# Patient Record
Sex: FEMALE | Race: ASIAN | NOT HISPANIC OR LATINO | Employment: FULL TIME | URBAN - METROPOLITAN AREA
[De-identification: names, ages, dates, MRNs, and addresses within clinical notes are randomized per-mention and may not be internally consistent; named-entity substitution may affect disease eponyms.]

---

## 2020-02-05 ENCOUNTER — OFFICE VISIT (OUTPATIENT)
Dept: FAMILY MEDICINE | Facility: CLINIC | Age: 45
End: 2020-02-05
Payer: COMMERCIAL

## 2020-02-05 VITALS
SYSTOLIC BLOOD PRESSURE: 139 MMHG | WEIGHT: 146 LBS | TEMPERATURE: 99 F | HEART RATE: 80 BPM | HEIGHT: 62 IN | DIASTOLIC BLOOD PRESSURE: 86 MMHG | BODY MASS INDEX: 26.87 KG/M2

## 2020-02-05 DIAGNOSIS — R73.03 PRE-DIABETES: ICD-10-CM

## 2020-02-05 DIAGNOSIS — E06.9 THYROIDITIS: ICD-10-CM

## 2020-02-05 DIAGNOSIS — I10 BENIGN ESSENTIAL HTN: Primary | ICD-10-CM

## 2020-02-05 DIAGNOSIS — M25.512 ARTHRALGIA OF LEFT SHOULDER REGION: ICD-10-CM

## 2020-02-05 PROCEDURE — 3079F PR MOST RECENT DIASTOLIC BLOOD PRESSURE 80-89 MM HG: ICD-10-PCS | Mod: CPTII,S$GLB,, | Performed by: FAMILY MEDICINE

## 2020-02-05 PROCEDURE — 99205 OFFICE O/P NEW HI 60 MIN: CPT | Mod: S$GLB,,, | Performed by: FAMILY MEDICINE

## 2020-02-05 PROCEDURE — 3075F SYST BP GE 130 - 139MM HG: CPT | Mod: CPTII,S$GLB,, | Performed by: FAMILY MEDICINE

## 2020-02-05 PROCEDURE — 3075F PR MOST RECENT SYSTOLIC BLOOD PRESS GE 130-139MM HG: ICD-10-PCS | Mod: CPTII,S$GLB,, | Performed by: FAMILY MEDICINE

## 2020-02-05 PROCEDURE — 99999 PR PBB SHADOW E&M-NEW PATIENT-LVL III: ICD-10-PCS | Mod: PBBFAC,,, | Performed by: FAMILY MEDICINE

## 2020-02-05 PROCEDURE — 3008F BODY MASS INDEX DOCD: CPT | Mod: CPTII,S$GLB,, | Performed by: FAMILY MEDICINE

## 2020-02-05 PROCEDURE — 99999 PR PBB SHADOW E&M-NEW PATIENT-LVL III: CPT | Mod: PBBFAC,,, | Performed by: FAMILY MEDICINE

## 2020-02-05 PROCEDURE — 3079F DIAST BP 80-89 MM HG: CPT | Mod: CPTII,S$GLB,, | Performed by: FAMILY MEDICINE

## 2020-02-05 PROCEDURE — 3008F PR BODY MASS INDEX (BMI) DOCUMENTED: ICD-10-PCS | Mod: CPTII,S$GLB,, | Performed by: FAMILY MEDICINE

## 2020-02-05 PROCEDURE — 99205 PR OFFICE/OUTPT VISIT, NEW, LEVL V, 60-74 MIN: ICD-10-PCS | Mod: S$GLB,,, | Performed by: FAMILY MEDICINE

## 2020-02-05 RX ORDER — PREDNISONE 10 MG/1
TABLET ORAL
COMMUNITY
Start: 2020-01-23 | End: 2020-03-02 | Stop reason: ALTCHOICE

## 2020-02-05 RX ORDER — LISINOPRIL 10 MG/1
10 TABLET ORAL DAILY
COMMUNITY
Start: 2020-01-18 | End: 2020-03-03 | Stop reason: SDUPTHER

## 2020-02-05 RX ORDER — LEVOTHYROXINE SODIUM 50 UG/1
50 TABLET ORAL
Qty: 30 TABLET | Refills: 11 | Status: SHIPPED | OUTPATIENT
Start: 2020-02-05 | End: 2020-03-08 | Stop reason: SDUPTHER

## 2020-02-05 RX ORDER — ASPIRIN 81 MG/1
81 TABLET ORAL DAILY
COMMUNITY
Start: 2020-01-10

## 2020-02-05 NOTE — PROGRESS NOTES
The patient presents today to fu recent fatigue lt shoulder pain. BP was el and was started on lisinopril. Lab showed el TSH and TPO T4 borderline low. Also a1c cw pre diabetes     Past Medical History:  Past Medical History:   Diagnosis Date    Asthma     Hypertension      Past Surgical History:   Procedure Laterality Date     SECTION       Review of patient's allergies indicates:  No Known Allergies  Current Outpatient Medications on File Prior to Visit   Medication Sig Dispense Refill    aspirin (ECOTRIN) 81 MG EC tablet Take 81 mg by mouth.      lisinopril 10 MG tablet Take 10 mg by mouth once daily.      predniSONE (DELTASONE) 10 MG tablet TAKE 1 TABLET BY MOUTH IN THE MORNING WITH FOOD       No current facility-administered medications on file prior to visit.      Social History     Socioeconomic History    Marital status:      Spouse name: Not on file    Number of children: Not on file    Years of education: Not on file    Highest education level: Not on file   Occupational History    Not on file   Social Needs    Financial resource strain: Not on file    Food insecurity:     Worry: Not on file     Inability: Not on file    Transportation needs:     Medical: Not on file     Non-medical: Not on file   Tobacco Use    Smoking status: Never Smoker   Substance and Sexual Activity    Alcohol use: Not on file    Drug use: Not on file    Sexual activity: Not on file   Lifestyle    Physical activity:     Days per week: Not on file     Minutes per session: Not on file    Stress: Not on file   Relationships    Social connections:     Talks on phone: Not on file     Gets together: Not on file     Attends [employer] service: Not on file     Active member of club or organization: Not on file     Attends meetings of clubs or organizations: Not on file     Relationship status: Not on file   Other Topics Concern    Not on file   Social History Narrative    Not on file     History reviewed.  No pertinent family history.      ROS:GENERAL: No fever, chills, fatigability or weight loss.  SKIN: No rashes, itching or changes in color or texture of skin.  HEAD: No headaches or recent head trauma.EYES: Visual acuity fine. No photophobia, ocular pain or diplopia.EARS: Denies ear pain, discharge or vertigo.NOSE: No loss of smell, no epistaxis or postnasal drip.MOUTH & THROAT: No hoarseness or change in voice. No excessive gum bleeding.NODES: Denies swollen glands.  CHEST: Denies MORENO, cyanosis, wheezing, cough and sputum production.  CARDIOVASCULAR: Denies chest pain, PND, orthopnea or reduced exercise tolerance.  ABDOMEN: Appetite fine. No weight loss. Denies diarrhea, abdominal pain, hematemesis or blood in stool.  URINARY: No flank pain, dysuria or hematuria.  PERIPHERAL VASCULAR: No claudication or cyanosis.  MUSCULOSKELETAL: See above.  NEUROLOGIC: No history of seizures, paralysis, alteration of gait or coordination.  PE:    HEAD: Normocephalic, atraumatic.EYES: PERRL. EOMI.   EARS: TM's intact. Light reflex normal. No retraction or perforation.   NOSE: Mucosa pink. Airway clear.MOUTH & THROAT: No tonsillar enlargement. No pharyngeal erythema or exudate. No stridor.  NODES: No cervical, axillary or inguinal lymph node enlargement.  CHEST: Lungs clear to auscultation.  CARDIOVASCULAR: Normal S1, S2. No rubs, murmurs or gallops.  ABDOMEN: Bowel sounds normal. Not distended. Soft. No tenderness or masses.  MUSCULOSKELETAL: Tender anterior lt shoulder rom full but exac pain   NEUROLOGIC: Cranial Nerves: II-XII grossly intact.  Motor: 5/5 strength major flexors/extensors.  DTR's: Knees, Ankles 2+ and equal bilaterally; downgoing toes.  Sensory: Intact to light touch distally.  Gait & Posture: Normal gait and fine motion. No cerebellar signs.     Impression:Thyroiditis  Pre DM   HBP  Shoulder inflammation  Plan:Lab eval reviewed TSH 21 , TPO elevated, a1c 5.7   Rec diet and ex recs  Start thyroid replacement 50  mcg   Rec acetaminophen or advil for shoulder arthralgia   FU w PCP next week

## 2020-03-02 ENCOUNTER — OFFICE VISIT (OUTPATIENT)
Dept: FAMILY MEDICINE | Facility: CLINIC | Age: 45
End: 2020-03-02
Payer: COMMERCIAL

## 2020-03-02 ENCOUNTER — LAB VISIT (OUTPATIENT)
Dept: LAB | Facility: HOSPITAL | Age: 45
End: 2020-03-02
Attending: INTERNAL MEDICINE
Payer: COMMERCIAL

## 2020-03-02 VITALS
BODY MASS INDEX: 27.23 KG/M2 | DIASTOLIC BLOOD PRESSURE: 82 MMHG | TEMPERATURE: 99 F | SYSTOLIC BLOOD PRESSURE: 116 MMHG | HEIGHT: 62 IN | HEART RATE: 76 BPM | WEIGHT: 148 LBS

## 2020-03-02 DIAGNOSIS — R73.03 PRE-DIABETES: ICD-10-CM

## 2020-03-02 DIAGNOSIS — E06.9 THYROIDITIS: ICD-10-CM

## 2020-03-02 DIAGNOSIS — E03.9 HYPOTHYROIDISM, UNSPECIFIED TYPE: ICD-10-CM

## 2020-03-02 DIAGNOSIS — I10 BENIGN ESSENTIAL HTN: Primary | ICD-10-CM

## 2020-03-02 PROCEDURE — 99214 OFFICE O/P EST MOD 30 MIN: CPT | Mod: S$GLB,,, | Performed by: INTERNAL MEDICINE

## 2020-03-02 PROCEDURE — 3074F SYST BP LT 130 MM HG: CPT | Mod: CPTII,S$GLB,, | Performed by: INTERNAL MEDICINE

## 2020-03-02 PROCEDURE — 36415 COLL VENOUS BLD VENIPUNCTURE: CPT | Mod: PO

## 2020-03-02 PROCEDURE — 99999 PR PBB SHADOW E&M-EST. PATIENT-LVL III: CPT | Mod: PBBFAC,,, | Performed by: INTERNAL MEDICINE

## 2020-03-02 PROCEDURE — 3074F PR MOST RECENT SYSTOLIC BLOOD PRESSURE < 130 MM HG: ICD-10-PCS | Mod: CPTII,S$GLB,, | Performed by: INTERNAL MEDICINE

## 2020-03-02 PROCEDURE — 3079F DIAST BP 80-89 MM HG: CPT | Mod: CPTII,S$GLB,, | Performed by: INTERNAL MEDICINE

## 2020-03-02 PROCEDURE — 83036 HEMOGLOBIN GLYCOSYLATED A1C: CPT

## 2020-03-02 PROCEDURE — 99214 PR OFFICE/OUTPT VISIT, EST, LEVL IV, 30-39 MIN: ICD-10-PCS | Mod: S$GLB,,, | Performed by: INTERNAL MEDICINE

## 2020-03-02 PROCEDURE — 84443 ASSAY THYROID STIM HORMONE: CPT

## 2020-03-02 PROCEDURE — 99999 PR PBB SHADOW E&M-EST. PATIENT-LVL III: ICD-10-PCS | Mod: PBBFAC,,, | Performed by: INTERNAL MEDICINE

## 2020-03-02 PROCEDURE — 3079F PR MOST RECENT DIASTOLIC BLOOD PRESSURE 80-89 MM HG: ICD-10-PCS | Mod: CPTII,S$GLB,, | Performed by: INTERNAL MEDICINE

## 2020-03-02 PROCEDURE — 84439 ASSAY OF FREE THYROXINE: CPT

## 2020-03-02 PROCEDURE — 3008F BODY MASS INDEX DOCD: CPT | Mod: CPTII,S$GLB,, | Performed by: INTERNAL MEDICINE

## 2020-03-02 PROCEDURE — 3008F PR BODY MASS INDEX (BMI) DOCUMENTED: ICD-10-PCS | Mod: CPTII,S$GLB,, | Performed by: INTERNAL MEDICINE

## 2020-03-02 RX ORDER — CLOBETASOL PROPIONATE 0.5 MG/G
CREAM TOPICAL
COMMUNITY
Start: 2020-02-19

## 2020-03-02 NOTE — Clinical Note
Since blood pressure was still elevated on repeat. I recommend increasing the dose of her Lisinopril to 20mg once daily. I have sent in a new prescription to her pharmacy. Please schedule a blood pressure check with ancillary in 2 weeks

## 2020-03-03 LAB
ESTIMATED AVG GLUCOSE: 114 MG/DL (ref 68–131)
HBA1C MFR BLD HPLC: 5.6 % (ref 4–5.6)
T4 FREE SERPL-MCNC: 0.75 NG/DL (ref 0.71–1.51)
TSH SERPL DL<=0.005 MIU/L-ACNC: 8.01 UIU/ML (ref 0.4–4)

## 2020-03-03 RX ORDER — LISINOPRIL 20 MG/1
20 TABLET ORAL DAILY
Qty: 30 TABLET | Refills: 11 | Status: SHIPPED | OUTPATIENT
Start: 2020-03-03

## 2020-03-03 NOTE — PROGRESS NOTES
Assessment/Plan:    Benign essential HTN  -Above goal today  -Currently on lisinopril 10 mg. Plan to increase to 20 mg daily  -BP check in 2 weeks  -Denies adverse effects of medications  -Continue lifestyle modification with low sodium diet and exercise     Pre-diabetes  -Elevated A1C in past of 5.7  -Plan to repeat  -Continue working on lifestyle modification with diet and exercise    Thyroiditis  -Recent elevated TSH and TPO antibodies several months ago  -Denies symptoms of painful thyroid at any time  -Plan to start on Synthroid but patient still has not started  -Agreed to repeat thyroid studies but if TSH still elevated, we will start her on therapy  -She does reports symptoms of fatigue, otherwise denies any other symptoms      _____________________________________________________________________________________________________________________________________________________    Orders this visit:    Benign essential HTN  -     lisinopril (PRINIVIL,ZESTRIL) 20 MG tablet; Take 1 tablet (20 mg total) by mouth once daily.  Dispense: 30 tablet; Refill: 11    Pre-diabetes  -     Hemoglobin A1c; Standing    Hypothyroidism, unspecified type  -     TSH; Future; Expected date: 03/02/2020  -     T4, free; Future; Expected date: 03/02/2020    Thyroiditis      Follow up in about 6 months (around 9/2/2020).    Joseline To MD  _____________________________________________________________________________________________________________________________________________________    HPI:    Patient is in clinic today as an established patient, new to me, here to establish care. Patient is a 43 yo Chinese F with a PMH of hypertension, prediabetes and recently diagnosed hypothyroidism.  Patient was recently evaluated by 1 of my colleagues and was started on Synthroid after noted to have an elevated TSH and elevated TPO antibodies.  However, she has not started this medication.  Patient was unsure exactly with medication was for  and with the testing indicated.  We had a discussion about hypothyroidism and testing results.  Patient does report some fatigue, otherwise denies any other symptoms related to hypothyroidism.  Patient would like to read check her labs but if still elevated, she agrees to start medications.    Patient recently diagnosed with hypertension and prediabetes.  She has been started on lisinopril since that time with some improvement of blood pressure, however still remains slightly elevated.  Remains asymptomatic.  As for her prediabetes, A1c was only mildly elevated.  We discussed diet and exercise.  No need for treatment at this time.    Patient reports a history of arthralgias of her left shoulder but seems to be improving.  This was addressed by her previous PCP.    No other complaints today.  Health maintenance reviewed.  Annual labs up-to-date.  Mammogram and [facility] smear in [facility].  Both normal.    Past Medical History:  Past Medical History:   Diagnosis Date    Asthma     Hypertension     Hypothyroid     Prediabetes      Past Surgical History:   Procedure Laterality Date     SECTION       Review of patient's allergies indicates:  No Known Allergies  Social History     Tobacco Use    Smoking status: Never Smoker    Smokeless tobacco: Never Used   Substance Use Topics    Alcohol use: Not Currently    Drug use: Not Currently     History reviewed. No pertinent family history.  Current Outpatient Medications on File Prior to Visit   Medication Sig Dispense Refill    aspirin (ECOTRIN) 81 MG EC tablet Take 81 mg by mouth once daily.       clobetasoL (TEMOVATE) 0.05 % cream Apply topically as needed.      [DISCONTINUED] lisinopril 10 MG tablet Take 10 mg by mouth once daily.      levothyroxine (SYNTHROID) 50 MCG tablet Take 1 tablet (50 mcg total) by mouth before breakfast. (Patient not taking: Reported on 3/2/2020) 30 tablet 11     No current facility-administered medications on file prior to  "visit.        Review of Systems   Constitutional: Negative for chills, diaphoresis, fatigue and fever.   HENT: Negative for congestion, ear pain, postnasal drip, sinus pain and sore throat.    Eyes: Negative for pain and redness.   Respiratory: Negative for cough, chest tightness and shortness of breath.    Cardiovascular: Negative for chest pain and leg swelling.   Gastrointestinal: Negative for abdominal pain, constipation, diarrhea, nausea and vomiting.   Genitourinary: Negative for dysuria and hematuria.   Musculoskeletal: Negative for joint swelling.   Skin: Negative for rash.   Neurological: Negative for dizziness, syncope and headaches.       Vitals:    03/02/20 1606 03/02/20 1647   BP: (!) 132/91 116/82   Pulse: 76    Temp: 98.7 °F (37.1 °C)    TempSrc: Oral    Weight: 67.1 kg (148 lb)    Height: 5' 2" (1.575 m)        Wt Readings from Last 3 Encounters:   03/02/20 67.1 kg (148 lb)   02/05/20 66.2 kg (146 lb)       Physical Exam   Constitutional: She is oriented to person, place, and time. She appears well-developed and well-nourished. No distress.   HENT:   Head: Normocephalic and atraumatic.   Eyes: Conjunctivae and EOM are normal.   Neck: Normal range of motion. Neck supple. No tracheal deviation present. No thyromegaly present.   Cardiovascular: Normal rate, regular rhythm, normal heart sounds and intact distal pulses.   No murmur heard.  Pulmonary/Chest: Effort normal and breath sounds normal. No respiratory distress.   Abdominal: She exhibits no distension.   Musculoskeletal: Normal range of motion.   Neurological: She is alert and oriented to person, place, and time.   Skin: Skin is warm and dry.   Psychiatric: She has a normal mood and affect.       "

## 2020-03-03 NOTE — ASSESSMENT & PLAN NOTE
-Elevated A1C in past of 5.7  -Plan to repeat  -Continue working on lifestyle modification with diet and exercise

## 2020-03-03 NOTE — ASSESSMENT & PLAN NOTE
-Above goal today  -Currently on lisinopril 10 mg. Plan to increase to 20 mg daily  -BP check in 2 weeks  -Denies adverse effects of medications  -Continue lifestyle modification with low sodium diet and exercise

## 2020-03-03 NOTE — ASSESSMENT & PLAN NOTE
-Recent elevated TSH and TPO antibodies several months ago  -Denies symptoms of painful thyroid at any time  -Plan to start on Synthroid but patient still has not started  -Agreed to repeat thyroid studies but if TSH still elevated, we will start her on therapy  -She does reports symptoms of fatigue, otherwise denies any other symptoms

## 2020-03-04 ENCOUNTER — PATIENT OUTREACH (OUTPATIENT)
Dept: ADMINISTRATIVE | Facility: HOSPITAL | Age: 45
End: 2020-03-04

## 2020-03-04 NOTE — PROGRESS NOTES
Patient notified of lisinopril dosage change and medication sent to pharmacy. Nurse visit scheduled for BP check on 03/19/2020 @ 4:15. Patient aware of appointment date and time.

## 2020-03-04 NOTE — PROGRESS NOTES
Received most recent mammogram and pap smear with hpv from [facility] scanned into chart today.

## 2020-03-08 DIAGNOSIS — E03.9 HYPOTHYROIDISM, UNSPECIFIED TYPE: Primary | ICD-10-CM

## 2020-03-08 RX ORDER — LEVOTHYROXINE SODIUM 50 UG/1
50 TABLET ORAL
Qty: 30 TABLET | Refills: 11 | Status: SHIPPED | OUTPATIENT
Start: 2020-03-08 | End: 2021-03-08

## 2020-03-09 NOTE — PROGRESS NOTES
Please CALL patient with below results.    Please schedule the following orders:    TSH & Free T4 in 8 weeks.    I have reviewed the following labs results:    Thyroid studies continue to be elevated. I recommend starting the thyroid medication we discussed in clinic, Synthroid 50 mcg. We will recheck your labs in 8 weeks to make sure the levels have normalized.    Hemoglobin A1c is normal at 5.6. Prediabetes is considered to be an A1C 5.7 and greater. Continue to work on diet and exercise to prevent progression to prediabetes.      Please let me know if patient has any additional questions or concerns about above.

## 2020-03-12 ENCOUNTER — TELEPHONE (OUTPATIENT)
Dept: FAMILY MEDICINE | Facility: CLINIC | Age: 45
End: 2020-03-12

## 2020-03-12 NOTE — TELEPHONE ENCOUNTER
----- Message from Giselle Soto sent at 3/12/2020 12:29 PM CDT -----  Contact: pt  Pt called to verify which medication to  at pharmacy (spouse currently at pharmacy) ... [phone] (home)

## 2020-03-12 NOTE — TELEPHONE ENCOUNTER
"Attempted to contact patient, no answer, "call could not be completed at this time", no voicemail set up.  "

## 2021-01-13 DIAGNOSIS — Z12.31 OTHER SCREENING MAMMOGRAM: ICD-10-CM

## 2021-04-09 ENCOUNTER — PATIENT OUTREACH (OUTPATIENT)
Dept: ADMINISTRATIVE | Facility: HOSPITAL | Age: 46
End: 2021-04-09

## 2021-09-17 ENCOUNTER — PATIENT OUTREACH (OUTPATIENT)
Dept: ADMINISTRATIVE | Facility: HOSPITAL | Age: 46
End: 2021-09-17

## 2021-10-22 ENCOUNTER — PATIENT OUTREACH (OUTPATIENT)
Dept: ADMINISTRATIVE | Facility: HOSPITAL | Age: 46
End: 2021-10-22

## 2021-11-19 ENCOUNTER — PATIENT OUTREACH (OUTPATIENT)
Dept: ADMINISTRATIVE | Facility: HOSPITAL | Age: 46
End: 2021-11-19
Payer: COMMERCIAL